# Patient Record
Sex: MALE | Race: WHITE | NOT HISPANIC OR LATINO | URBAN - METROPOLITAN AREA
[De-identification: names, ages, dates, MRNs, and addresses within clinical notes are randomized per-mention and may not be internally consistent; named-entity substitution may affect disease eponyms.]

---

## 2017-05-17 ENCOUNTER — COMMUNICATION - HEALTHEAST (OUTPATIENT)
Dept: SCHEDULING | Facility: CLINIC | Age: 32
End: 2017-05-17

## 2017-05-17 ENCOUNTER — RECORDS - HEALTHEAST (OUTPATIENT)
Dept: ADMINISTRATIVE | Facility: OTHER | Age: 32
End: 2017-05-17

## 2017-07-10 ENCOUNTER — OFFICE VISIT - HEALTHEAST (OUTPATIENT)
Dept: FAMILY MEDICINE | Facility: CLINIC | Age: 32
End: 2017-07-10

## 2017-07-10 DIAGNOSIS — K20.0 EOSINOPHILIC ESOPHAGITIS: ICD-10-CM

## 2017-07-10 DIAGNOSIS — K21.9 GASTROESOPHAGEAL REFLUX DISEASE WITHOUT ESOPHAGITIS: ICD-10-CM

## 2017-07-10 DIAGNOSIS — Z30.09 ENCOUNTER FOR VASECTOMY COUNSELING: ICD-10-CM

## 2017-07-10 DIAGNOSIS — G43.009 MIGRAINE WITHOUT AURA: ICD-10-CM

## 2017-07-10 DIAGNOSIS — K21.9 ESOPHAGEAL REFLUX: ICD-10-CM

## 2017-07-10 ASSESSMENT — MIFFLIN-ST. JEOR: SCORE: 2106.06

## 2017-08-17 ENCOUNTER — RECORDS - HEALTHEAST (OUTPATIENT)
Dept: ADMINISTRATIVE | Facility: OTHER | Age: 32
End: 2017-08-17

## 2018-05-22 ENCOUNTER — OFFICE VISIT - HEALTHEAST (OUTPATIENT)
Dept: FAMILY MEDICINE | Facility: CLINIC | Age: 33
End: 2018-05-22

## 2018-05-22 DIAGNOSIS — K20.0 EOSINOPHILIC ESOPHAGITIS: ICD-10-CM

## 2018-07-15 ENCOUNTER — COMMUNICATION - HEALTHEAST (OUTPATIENT)
Dept: FAMILY MEDICINE | Facility: CLINIC | Age: 33
End: 2018-07-15

## 2018-07-15 DIAGNOSIS — G43.009 MIGRAINE WITHOUT AURA: ICD-10-CM

## 2018-09-20 ENCOUNTER — OFFICE VISIT - HEALTHEAST (OUTPATIENT)
Dept: FAMILY MEDICINE | Facility: CLINIC | Age: 33
End: 2018-09-20

## 2018-09-20 DIAGNOSIS — K20.0 EOSINOPHILIC ESOPHAGITIS: ICD-10-CM

## 2018-09-20 DIAGNOSIS — Z13.220 LIPID SCREENING: ICD-10-CM

## 2018-09-20 DIAGNOSIS — G43.009 MIGRAINE WITHOUT AURA: ICD-10-CM

## 2018-09-20 DIAGNOSIS — K21.9 ESOPHAGEAL REFLUX: ICD-10-CM

## 2018-09-20 LAB
ANION GAP SERPL CALCULATED.3IONS-SCNC: 7 MMOL/L (ref 5–18)
BUN SERPL-MCNC: 8 MG/DL (ref 8–22)
CALCIUM SERPL-MCNC: 9.8 MG/DL (ref 8.5–10.5)
CHLORIDE BLD-SCNC: 105 MMOL/L (ref 98–107)
CHOLEST SERPL-MCNC: 194 MG/DL
CO2 SERPL-SCNC: 28 MMOL/L (ref 22–31)
CREAT SERPL-MCNC: 0.88 MG/DL (ref 0.7–1.3)
FASTING STATUS PATIENT QL REPORTED: YES
GFR SERPL CREATININE-BSD FRML MDRD: >60 ML/MIN/1.73M2
GLUCOSE BLD-MCNC: 92 MG/DL (ref 70–125)
HDLC SERPL-MCNC: 43 MG/DL
HGB BLD-MCNC: 13.7 G/DL (ref 14–18)
LDLC SERPL CALC-MCNC: 120 MG/DL
POTASSIUM BLD-SCNC: 4.4 MMOL/L (ref 3.5–5)
SODIUM SERPL-SCNC: 140 MMOL/L (ref 136–145)
TRIGL SERPL-MCNC: 154 MG/DL

## 2019-01-07 ENCOUNTER — COMMUNICATION - HEALTHEAST (OUTPATIENT)
Dept: FAMILY MEDICINE | Facility: CLINIC | Age: 34
End: 2019-01-07

## 2019-10-07 ENCOUNTER — OFFICE VISIT - HEALTHEAST (OUTPATIENT)
Dept: FAMILY MEDICINE | Facility: CLINIC | Age: 34
End: 2019-10-07

## 2019-10-07 DIAGNOSIS — I88.9 AXILLARY ADENITIS: ICD-10-CM

## 2019-10-07 DIAGNOSIS — K21.9 GASTROESOPHAGEAL REFLUX DISEASE WITHOUT ESOPHAGITIS: ICD-10-CM

## 2019-10-07 DIAGNOSIS — K20.0 EOSINOPHILIC ESOPHAGITIS: ICD-10-CM

## 2019-10-07 DIAGNOSIS — M25.562 LEFT KNEE PAIN, UNSPECIFIED CHRONICITY: ICD-10-CM

## 2019-10-07 DIAGNOSIS — G43.009 MIGRAINE WITHOUT AURA: ICD-10-CM

## 2019-10-07 DIAGNOSIS — G43.009 MIGRAINE WITHOUT AURA AND WITHOUT STATUS MIGRAINOSUS, NOT INTRACTABLE: ICD-10-CM

## 2019-10-07 LAB
ANION GAP SERPL CALCULATED.3IONS-SCNC: 7 MMOL/L (ref 5–18)
BUN SERPL-MCNC: 9 MG/DL (ref 8–22)
CALCIUM SERPL-MCNC: 9.8 MG/DL (ref 8.5–10.5)
CHLORIDE BLD-SCNC: 106 MMOL/L (ref 98–107)
CO2 SERPL-SCNC: 28 MMOL/L (ref 22–31)
CREAT SERPL-MCNC: 1.04 MG/DL (ref 0.7–1.3)
GFR SERPL CREATININE-BSD FRML MDRD: >60 ML/MIN/1.73M2
GLUCOSE BLD-MCNC: 79 MG/DL (ref 70–125)
HGB BLD-MCNC: 13.3 G/DL (ref 14–18)
POTASSIUM BLD-SCNC: 4.3 MMOL/L (ref 3.5–5)
SODIUM SERPL-SCNC: 141 MMOL/L (ref 136–145)

## 2019-10-07 ASSESSMENT — MIFFLIN-ST. JEOR: SCORE: 2092.45

## 2020-07-13 ENCOUNTER — COMMUNICATION - HEALTHEAST (OUTPATIENT)
Dept: SCHEDULING | Facility: CLINIC | Age: 35
End: 2020-07-13

## 2020-08-20 ENCOUNTER — OFFICE VISIT - HEALTHEAST (OUTPATIENT)
Dept: FAMILY MEDICINE | Facility: CLINIC | Age: 35
End: 2020-08-20

## 2020-08-20 DIAGNOSIS — G47.10 HYPERSOMNIA: ICD-10-CM

## 2020-08-20 DIAGNOSIS — K21.9 GASTROESOPHAGEAL REFLUX DISEASE WITHOUT ESOPHAGITIS: ICD-10-CM

## 2020-08-20 DIAGNOSIS — R06.83 SNORING: ICD-10-CM

## 2020-08-20 DIAGNOSIS — Z13.220 LIPID SCREENING: ICD-10-CM

## 2020-08-20 DIAGNOSIS — K20.0 EOSINOPHILIC ESOPHAGITIS: ICD-10-CM

## 2020-08-20 DIAGNOSIS — Z86.2 HISTORY OF ANEMIA: ICD-10-CM

## 2020-08-20 ASSESSMENT — MIFFLIN-ST. JEOR: SCORE: 2115.13

## 2020-08-21 LAB
CHOLEST SERPL-MCNC: 237 MG/DL
ERYTHROCYTE [DISTWIDTH] IN BLOOD BY AUTOMATED COUNT: 12.6 % (ref 11–14.5)
FASTING STATUS PATIENT QL REPORTED: NO
HCT VFR BLD AUTO: 39.5 % (ref 40–54)
HDLC SERPL-MCNC: 45 MG/DL
HGB BLD-MCNC: 14.1 G/DL (ref 14–18)
LDLC SERPL CALC-MCNC: 149 MG/DL
MCH RBC QN AUTO: 32 PG (ref 27–34)
MCHC RBC AUTO-ENTMCNC: 35.7 G/DL (ref 32–36)
MCV RBC AUTO: 90 FL (ref 80–100)
PLATELET # BLD AUTO: 204 THOU/UL (ref 140–440)
PMV BLD AUTO: 9.6 FL (ref 8.5–12.5)
RBC # BLD AUTO: 4.41 MILL/UL (ref 4.4–6.2)
TRIGL SERPL-MCNC: 214 MG/DL
WBC: 6.8 THOU/UL (ref 4–11)

## 2020-08-24 ENCOUNTER — COMMUNICATION - HEALTHEAST (OUTPATIENT)
Dept: FAMILY MEDICINE | Facility: CLINIC | Age: 35
End: 2020-08-24

## 2020-10-02 ENCOUNTER — COMMUNICATION - HEALTHEAST (OUTPATIENT)
Dept: FAMILY MEDICINE | Facility: CLINIC | Age: 35
End: 2020-10-02

## 2020-10-02 DIAGNOSIS — K20.0 EOSINOPHILIC ESOPHAGITIS: ICD-10-CM

## 2020-10-02 DIAGNOSIS — K21.9 GASTROESOPHAGEAL REFLUX DISEASE WITHOUT ESOPHAGITIS: ICD-10-CM

## 2020-10-02 DIAGNOSIS — G43.009 MIGRAINE WITHOUT AURA: ICD-10-CM

## 2021-01-12 VITALS — WEIGHT: 235 LBS | BODY MASS INDEX: 27.75 KG/M2 | HEIGHT: 77 IN

## 2021-01-12 RX ORDER — PROPRANOLOL HYDROCHLORIDE 60 MG/1
60 TABLET ORAL DAILY
COMMUNITY

## 2021-01-12 RX ORDER — PANTOPRAZOLE SODIUM 40 MG/1
40 TABLET, DELAYED RELEASE ORAL DAILY
COMMUNITY

## 2021-01-12 ASSESSMENT — MIFFLIN-ST. JEOR: SCORE: 2108.33

## 2021-01-12 NOTE — PROGRESS NOTES
Ray is a 35 year old who is being evaluated via a billable phone visit.      Patient gave consent for phone visit.    Time on phone callt: 16 minutes      Sleep Consultation:    Date on this visit: 1/13/2021    Ray Sharp is sent by No ref. provider found for a sleep consultation regarding possible sleep apnea.    Primary Physician: Pablo Aponte     Ray Sharp reports nightly snoring and poor quality of sleep for last 5 years.     Medical history is significant for GERD & migraine.     Ray does snore every night. Patient does have a regular bed partner. There is report of snoring and poor quality of sleep.  He does not have witnessed apneas. They never sleep separately.  Patient sleeps on his back and side. He has occasional morning headaches, denies no restless legs. Ray has occasional sleep walking and denies any bruxism, sleep talking, dream enactment, sleep paralysis, cataplexy and hypnogogic/hypnopompic hallucinations. There are episodes when he gets out of bed thinking there are bugs on the bed.     Ray goes to sleep at 10:30 PM during the week. He wakes up at 5:30 AM without an alarm. He falls asleep in 15 minutes.  Ray denies difficulty falling asleep.  He wakes up 0-1 times a night for 5 minutes before falling back to sleep.  Ray wakes up to go to the bathroom.  On weekends, Ray goes to sleep at 11:00 PM.  He wakes up at 7:00 AM without an alarm. He falls asleep in 15 minutes.  Patient gets an average of 7 hours of sleep per night.     He confirms sleep walking as a child.    Patient's [place] Sleepiness score 3/24 consistent with no daytime sleepiness.      Ray naps 0 times per week . He takes no inadvertant naps.  He denies closing eyes, dozing and falling asleep while driving.  Patient was counseled on the importance of driving while alert, to pull over if drowsy, or nap before getting into the vehicle if sleepy.      He uses no caffeine.    Allergies:     Allergies   Allergen Reactions     Codeine Anaphylaxis       Medications:    Current Outpatient Medications   Medication Sig Dispense Refill     pantoprazole (PROTONIX) 40 MG EC tablet Take 40 mg by mouth daily       propranolol (INDERAL) 60 MG tablet Take 60 mg by mouth daily         Problem List:  There are no active problems to display for this patient.       Past Medical/Surgical History:  Past Medical History:   Diagnosis Date     GERD (gastroesophageal reflux disease)      Migraine      No past surgical history on file.    Social History:  Social History     Socioeconomic History     Marital status:      Spouse name: Not on file     Number of children: Not on file     Years of education: Not on file     Highest education level: Not on file   Occupational History     Not on file   Social Needs     Financial resource strain: Not on file     Food insecurity     Worry: Not on file     Inability: Not on file     Transportation needs     Medical: Not on file     Non-medical: Not on file   Tobacco Use     Smoking status: Never Smoker     Smokeless tobacco: Never Used   Substance and Sexual Activity     Alcohol use: Not on file     Drug use: Not on file     Sexual activity: Not on file   Lifestyle     Physical activity     Days per week: Not on file     Minutes per session: Not on file     Stress: Not on file   Relationships     Social connections     Talks on phone: Not on file     Gets together: Not on file     Attends [employer]odoxy service: Not on file     Active member of club or organization: Not on file     Attends meetings of clubs or organizations: Not on file     Relationship status: Not on file     Intimate partner violence     Fear of current or ex partner: Not on file     Emotionally abused: Not on file     Physically abused: Not on file     Forced sexual activity: Not on file   Other Topics Concern     Parent/sibling w/ CABG, MI or angioplasty before 65F 55M? Not Asked   Social History Narrative      "Not on file       Family History:  Family History   Problem Relation Age of Onset     Sleep Apnea Father        Review of Systems:  A complete review of systems reviewed by me is negative with the exeption of what has been mentioned in the history of present illness.  CONSTITUTIONAL: NEGATIVE for weight gain/loss, fever, chills, sweats or night sweats, drug allergies.  EYES: NEGATIVE for changes in vision, blind spots, double vision.  ENT: NEGATIVE for ear pain, sore throat, sinus pain, post-nasal drip, runny nose, bloody nose  CARDIAC: NEGATIVE for fast heartbeats or fluttering in chest, chest pain or pressure, breathlessness when lying flat, swollen legs or swollen feet.  NEUROLOGIC: NEGATIVE headaches, weakness or numbness in the arms or legs.  DERMATOLOGIC: NEGATIVE for rashes, new moles or change in mole(s)  PULMONARY: NEGATIVE SOB at rest, SOB with activity, dry cough, productive cough, coughing up blood, wheezing or whistling when breathing.    GASTROINTESTINAL: NEGATIVE for nausea or vomitting, loose or watery stools, fat or grease in stools, constipation, abdominal pain, bowel movements black in color or blood noted.  GENITOURINARY: NEGATIVE for pain during urination, blood in urine, urinating more frequently than usual, irregular menstrual periods.  MUSCULOSKELETAL: NEGATIVE for muscle pain, bone or joint pain, swollen joints.  ENDOCRINE: NEGATIVE for increased thirst or urination, diabetes.  LYMPHATIC: NEGATIVE for swollen lymph nodes, lumps or bumps in the breasts or nipple discharge.    Physical Examination:  Vitals: Ht 1.956 m (6' 5\")   Wt 106.6 kg (235 lb)   BMI 27.87 kg/m    BMI= Body mass index is 27.87 kg/m .         [place] Total Score 1/12/2021   Total score - [place] 3       RAJ Total Score: 10 (01/12/21 1500)    Impression/Plan:    1. Probable Obstructive sleep apnea     Patient is a 35 years old male, BMI 27, who presents with history of loud snoring, non restorative sleep and daytime " fatigue. There is an intermediate risk for sleep apnea and an overnight sleep study is recommended for assessment.     Plan:     1. Home sleep apnea testing     Literature provided regarding sleep apnea.      He will follow up with me in approximately two weeks after his sleep study has been competed to review the results and discuss plan of care.       Polysomnography & HST reviewed.  Limitations of HST reviewed.   Obstructive sleep apnea reviewed.  Complications of untreated sleep apnea were reviewed.    I spent a total of 30 minutes for this appointment today which include time spent before, during and after the visit for patient care, counseling and coordination of care.    Dr. Leighton Barton     CC: No ref. provider found

## 2021-01-12 NOTE — PATIENT INSTRUCTIONS
"MY TREATMENT INFORMATION FOR SLEEP APNEA-  [facility]    DOCTOR : Leighton Barton MD, MD    Am I having a sleep study at a sleep center?  --->Due to insurance clearance delays, you will be contacted within 2-4 weeks to schedule    Am I having a home sleep study?  --->Watch the video for the device you are using:    /drop off device-   https://www.[employer].com/watch?v=yGGFBdELGhk    Disposable device sent out require phone/computer application-   https://www.[employer].com/watch?v=BCce_vbiwxE      Frequently asked questions:  1. What is Obstructive Sleep Apnea (YOGI)? YOGI is the most common type of sleep apnea. Apnea means, \"without breath.\"  Apnea is most often caused by narrowing or collapse of the upper airway as muscles relax during sleep.   Almost everyone has occasional apneas. Most people with sleep apnea have had brief interruptions at night frequently for many years.  The severity of sleep apnea is related to how frequent and severe the events are.   2. What are the consequences of YOGI? Symptoms include: feeling sleepy during the day, snoring loudly, gasping or stopping of breathing, trouble sleeping, and occasionally morning headaches or heartburn at night.  Sleepiness can be serious and even increase the risk of falling asleep while driving. Other health consequences may include development of high blood pressure and other cardiovascular disease in persons who are susceptible. Untreated YOGI  can contribute to heart disease, stroke and diabetes.   3. What are the treatment options? In most situations, sleep apnea is a lifelong disease that must be managed with daily therapy. Medications are not effective for sleep apnea and surgery is generally not considered until other therapies have been tried. Your treatment is your choice . Continuous Positive Airway (CPAP) works right away and is the therapy that is effective in nearly everyone. An oral device to hold your jaw forward is usually the next most " reliable option. Other options include postioning devices (to keep you off your back), weight loss, and surgery including a tongue pacing device. There is more detail about some of these options below.  4. Are my sleep studies covered by insurance? Although we will request verification of coverage, we advise you also check in advance of the study to ensure there is coverage.    Important tips for those choosing CPAP and similar devices   Know your equipment:  CPAP is continuous positive airway pressure that prevents obstructive sleep apnea by keeping the throat from collapsing while you are sleeping. In most cases, the device is  smart  and can slowly self-adjusts if your throat collapses and keeps a record every day of how well you are treated-this information is available to you and your care team.  BPAP is bilevel positive airway pressure that keeps your throat open and also assists each breath with a pressure boost to maintain adequate breathing.  Special kinds of BPAP are used in patients who have inadequate breathing from lung or heart disease. In most cases, the device is  smart  and can slowly self-adjusts to assist breathing. Like CPAP, the device keeps a record of how well you are treated.  Your mask is your connection to the device. You get to choose what feels most comfortable and the staff will help to make sure if fits. Here: are some examples of the different masks that are available:       Key points to remember on your journey with sleep apnea:  1. Sleep study.  PAP devices often need to be adjusted during a sleep study to show that they are effective and adjusted right.  2. Good tips to remember: Try wearing just the mask during a quiet time during the day so your body adapts to wearing it. A humidifier is recommended for comfort in most cases to prevent drying of your nose and throat. Allergy medication from your provider may help you if you are having nasal congestion.  3. Getting settled-in. It  takes more than one night for most of us to get used to wearing a mask. Try wearing just the mask during a quiet time during the day so your body adapts to wearing it. A humidifier is recommended for comfort in most cases. Our team will work with you carefully on the first day and will be in contact within 4 days and again at 2 and 4 weeks for advice and remote device adjustments. Your therapy is evaluated by the device each day.   4. Use it every night. The more you are able to sleep naturally for 7-8 hours, the more likely you will have good sleep and to prevent health risks or symptoms from sleep apnea. Even if you use it 4 hours it helps. Occasionally all of us are unable to use a medical therapy, in sleep apnea, it is not dangerous to miss one night.   5. Communicate. Call our skilled team on the number provided on the first day if your visit for problems that make it difficult to wear the device. Over 2 out of 3 patients can learn to wear the device long-term with help from our team. Remember to call our team or your sleep providers if you are unable to wear the device as we may have other solutions for those who cannot adapt to mask CPAP therapy. It is recommended that you sleep your sleep provider within the first 3 months and yearly after that if you are not having problems.   6. Use it for your health. We encourage use of CPAP masks during daytime quiet periods to allow your face and brain to adapt to the sensation of CPAP so that it will be a more natural sensation to awaken to at night or during naps. This can be very useful during the first few weeks or months of adapting to CPAP though it does not help medically to wear CPAP during wakefulness and  should not be used as a strategy just to meet guidelines.  7. Take care of your equipment. Make sure you clean your mask and tubing using directions every day and that your filter and mask are replaced as recommended or if they are not working.     BESIDES  CPAP, WHAT OTHER THERAPIES ARE THERE?    Positioning Device  Positioning devices are generally used when sleep apnea is mild and only occurs on your back.This example shows a pillow that straps around the waist. It may be appropriate for those whose sleep study shows milder sleep apnea that occurs primarily when lying flat on one's back. Preliminary studies have shown benefit but effectiveness at home may need to be verified by a home sleep test. These devices are generally not covered by medical insurance.  Examples of devices that maintain sleeping on the back to prevent snoring and mild sleep apnea.    Belt type body positioner  http://[email or web address]/    Electronic reminder  http://nightshifttherapy.com/  http://[email or web address]/      Oral Appliance  What is oral appliance therapy?  An oral appliance device fits on your teeth at night like a retainer used after having braces. The device is made by a specialized dentist and requires several visits over 1-2 months before a manufactured device is made to fit your teeth and is adjusted to prevent your sleep apnea. Once an oral device is working properly, snoring should be improved. A home sleep test may be recommended at that time if to determine whether the sleep apnea is adequately treated.       Some things to remember:  -Oral devices are often, but not always, covered by your medical insurance. Be sure to check with your insurance provider.   -If you are referred for oral therapy, you will be given a list of specialized dentists to consider or you may choose to visit the Web site of the American Academy of Dental Sleep Medicine  -Oral devices are less likely to work if you have severe sleep apnea or are extremely overweight.     More detailed information  An oral appliance is a small acrylic device that fits over the upper and lower teeth  (similar to a retainer or a mouth guard). This device slightly moves jaw forward, which moves the base of the tongue forward,  opens the airway, improves breathing for effective treat snoring and obstructive sleep apnea in perhaps 7 out of 10 people .  The best working devices are custom-made by a dental device  after a mold is made of the teeth 1, 2, 3.  When is an oral appliance indicated?  Oral appliance therapy is recommended as a first-line treatment for patients with primary snoring, mild sleep apnea, and for patients with moderate sleep apnea who prefer appliance therapy to use of CPAP4, 5. Severity of sleep apnea is determined by sleep testing and is based on the number of respiratory events per hour of sleep.   How successful is oral appliance therapy?  The success rate of oral appliance therapy in patients with mild sleep apnea is 75-80% while in patients with moderate sleep apnea it is 50-70%. The chance of success in patients with severe sleep apnea is 40-50%. The research also shows that oral appliances have a beneficial effect on the cardiovascular health of YOGI patients at the same magnitude as CPAP therapy7.  Oral appliances should be a second-line treatment in cases of severe sleep apnea, but if not completely successful then a combination therapy utilizing CPAP plus oral appliance therapy may be effective. Oral appliances tend to be effective in a broad range of patients although studies show that the patients who have the highest success are females, younger patients, those with milder disease, and less severe obesity. 3, 6.   Finding a dentist that practices dental sleep medicine  Specific training is available through the American Academy of Dental Sleep Medicine for dentists interested in working in the field of sleep. To find a dentist who is educated in the field of sleep and the use of oral appliances, near you, visit the Web site of the American Academy of Dental Sleep Medicine.    References  1. Irineo et al. Objectively measured vs self-reported compliance during oral appliance therapy for  sleep-disordered breathing. Chest 2013; 144(5): 8553-0371.  2. Dioni, et al. Objective measurement of compliance during oral appliance therapy for sleep-disordered breathing. Thorax 2013; 68(1): 91-96.  3. Regulo et al. Mandibular advancement devices in 620 men and women with YOGI and snoring: tolerability and predictors of treatment success. Chest 2004; 125: 4085-8933.  4. Almaz et al. Oral appliances for snoring and YOGI: a review. Sleep 2006; 29: 244-262.  5. Caitlin et al. Oral appliance treatment for YOGI: an update. J Clin Sleep Med 2014; 10(2): 215-227.  6. Bhakti et al. Predictors of OSAH treatment outcome. J Dent Res 2007; 86: 1617-0764.      Weight Loss:    Weight loss is a long-term strategy that may improve sleep apnea in some patients.    Weight management is a personal decision and the decision should be based on your interest and the potential benefits.  If you are interested in exploring weight loss strategies, the following discussion covers the impact on weight loss on sleep apnea and the approaches that may be successful.    Being overweight does not necessarily mean you will have health consequences.  Those who have BMI over 35 or over 27 with existing medical conditions carries greater risk.   Weight loss decreases severity of sleep apnea in most people with obesity. For those with mild obesity who have developed snoring with weight gain, even 15-30 pound weight loss can improve and occasionally eliminate sleep apnea.  Structured and life-long dietary and health habits are necessary to lose weight and keep healthier weight levels.     Though there may be significant health benefits from weight loss, long-term weight loss is very difficult to achieve- studies show success with dietary management in less than 10% of people. In addition, substantial weight loss may require years of dietary control and may be difficult if patients have severe obesity. In these cases, surgical  management may be considered.  Finally, older individuals who have tolerated obesity without health complications may be less likely to benefit from weight loss strategies.        Your BMI is Body mass index is 27.87 kg/m .  Weight management is a personal decision.  If you are interested in exploring weight loss strategies, the following discussion covers the approaches that may be successful. Body mass index (BMI) is one way to tell whether you are at a healthy weight, overweight, or obese. It measures your weight in relation to your height.  A BMI of 18.5 to 24.9 is in the healthy range. A person with a BMI of 25 to 29.9 is considered overweight, and someone with a BMI of 30 or greater is considered obese. More than two-thirds of American adults are considered overweight or obese.  Being overweight or obese increases the risk for further weight gain. Excess weight may lead to heart disease and diabetes.  Creating and following plans for healthy eating and physical activity may help you improve your health.  Weight control is part of healthy lifestyle and includes exercise, emotional health, and healthy eating habits. Careful eating habits lifelong are the mainstay of weight control. Though there are significant health benefits from weight loss, long-term weight loss with diet alone may be very difficult to achieve- studies show long-term success with dietary management in less than 10% of people. Attaining a healthy weight may be especially difficult to achieve in those with severe obesity. In some cases, medications, devices and surgical management might be considered.  What can you do?  If you are overweight or obese and are interested in methods for weight loss, you should discuss this with your provider.     Consider reducing daily calorie intake by 500 calories.     Keep a food journal.     Avoiding skipping meals, consider cutting portions instead.    Diet combined with exercise helps maintain muscle while  optimizing fat loss. Strength training is particularly important for building and maintaining muscle mass. Exercise helps reduce stress, increase energy, and improves fitness. Increasing exercise without diet control, however, may not burn enough calories to loose weight.       Start walking three days a week 10-20 minutes at a time    Work towards walking thirty minutes five days a week     Eventually, increase the speed of your walking for 1-2 minutes at time    In addition, we recommend that you review healthy lifestyles and methods for weight loss available through the National Institutes of Health patient information sites:  http://win.niddk.nih.gov/publications/index.htm    And look into health and wellness programs that may be available through your health insurance provider, employer, local community center, or frank club.    Weight management plan: Patient was referred to their PCP to discuss a diet and exercise plan.      Surgery:    Surgery for obstructive sleep apnea is considered generally only when other therapies fail to work. Surgery may be discussed with you if you are having a difficult time tolerating CPAP and or when there is an abnormal structure that requires surgical correction.  Nose and throat surgeries often enlarge the airway to prevent collapse.  Most of these surgeries create pain for 1-2 weeks and up to half of the most common surgeries are not effective throughout life.  You should carefully discuss the benefits and drawbacks to surgery with your sleep provider and surgeon to determine if it is the best solution for you.   More information  Surgery for YOGI is directed at areas that are responsible for narrowing or complete obstruction of the airway during sleep.  There are a wide range of procedures available to enlarge and/or stabilize the airway to prevent blockage of breathing in the three major areas where it can occur: the palate, tongue, and nasal regions.  Successful surgical  treatment depends on the accurate identification of the factors responsible for obstructive sleep apnea in each person.  A personalized approach is required because there is no single treatment that works well for everyone.  Because of anatomic variation, consultation with an examination by a sleep surgeon is a critical first step in determining what surgical options are best for each patient.  In some cases, examination during sedation may be recommended in order to guide the selection of procedures.  Patients will be counseled about risks and benefits as well as the typical recovery course after surgery. Surgery is typically not a cure for a person s YOGI.  However, surgery will often significantly improve one s YOGI severity (termed  success rate ).  Even in the absence of a cure, surgery will decrease the cardiovascular risk associated with OSA7; improve overall quality of life8 (sleepiness, functionality, sleep quality, etc).      Palate Procedures:  Patients with YOGI often have narrowing of their airway in the region of their tonsils and uvula.  The goals of palate procedures are to widen the airway in this region as well as to help the tissues resist collapse.  Modern palate procedure techniques focus on tissue conservation and soft tissue rearrangement, rather than tissue removal.  Often the uvula is preserved in this procedure. Residual sleep apnea is common in patient after pharyngoplasty with an average reduction in sleep apnea events of 33%2.      Tongue Procedures:  ExamWhile patients are awake, the muscles that surround the throat are active and keep this region open for breathing. These muscles relax during sleep, allowing the tongue and other structures to collapse and block breathing.  There are several different tongue procedures available.  Selection of a tongue base procedure depends on characteristics seen on physical exam.  Generally, procedures are aimed at removing bulky tissues in this area or  preventing the back of the tongue from falling back during sleep.  Success rates for tongue surgery range from 50-62%3.    Hypoglossal Nerve Stimulation:  Hypoglossal nerve stimulation has recently received approval from the [place] Food and Drug Administration for the treatment of obstructive sleep apnea.  This is based on research showing that the system was safe and effective in treating sleep apnea6.  Results showed that the median AHI score decreased 68%, from 29.3 to 9.0. This therapy uses an implant system that senses breathing patterns and delivers mild stimulation to airway muscles, which keeps the airway open during sleep.  The system consists of three fully implanted components: a small generator (similar in size to a pacemaker), a breathing sensor, and a stimulation lead.  Using a small handheld remote, a patient turns the therapy on before bed and off upon awakening.    Candidates for this device must be greater than 22 years of age, have moderate to severe YOGI (AHI between 20-65), BMI less than 32, have tried CPAP/oral appliance without success, and have appropriate upper airway anatomy (determined by a sleep endoscopy performed by Dr. Hurt).    Hypoglossal Nerve Stimulation Pathway:    The sleep surgeon s office will work with the patient through the insurance prior-authorization process (including communications and appeals).    Nasal Procedures:  Nasal obstruction can interfere with nasal breathing during the day and night.  Studies have shown that relief of nasal obstruction can improve the ability of some patients to tolerate positive airway pressure therapy for obstructive sleep apnea1.  Treatment options include medications such as nasal saline, topical corticosteroid and antihistamine sprays, and oral medications such as antihistamines or decongestants. Non-surgical treatments can include external nasal dilators for selected patients. If these are not successful by themselves, surgery can  improve the nasal airway either alone or in combination with these other options.      Combination Procedures:  Combination of surgical procedures and other treatments may be recommended, particularly if patients have more than one area of narrowing or persistent positional disease.  The success rate of combination surgery ranges from 66-80%2,3.    References  1. Tayo CONNORS. The Role of the Nose in Snoring and Obstructive Sleep Apnoea: An Update.  Eur Arch Otorhinolaryngol. 2011; 268: 1365-73.  2.  Rakel SM; Gui JA; Rima JR; Pallanch JF; Chuckie MB; Amanda SG; Sarah AMBROSIO. Surgical modifications of the upper airway for obstructive sleep apnea in adults: a systematic review and meta-analysis. SLEEP 2010;33(10):2698-3891. Kem JUNE. Hypopharyngeal surgery in obstructive sleep apnea: an evidence-based medicine review.  Arch Otolaryngol Head Neck Surg. 2006 Feb;132(2):206-13.  3. Bharat YH1, Harvey Y, Markie JOSE. The efficacy of anatomically based multilevel surgery for obstructive sleep apnea. Otolaryngol Head Neck Surg. 2003 Oct;129(4):327-35.  4. Kem JUNE, Goldberg A. Hypopharyngeal Surgery in Obstructive Sleep Apnea: An Evidence-Based Medicine Review. Arch Otolaryngol Head Neck Surg. 2006 Feb;132(2):206-13.  5. Tom GHOSH et al. Upper-Airway Stimulation for Obstructive Sleep Apnea.  N Engl J Med. 2014 Jan 9;370(2):139-49.  6. Siomara Y et al. Increased Incidence of Cardiovascular Disease in Middle-aged Men with Obstructive Sleep Apnea. Am J Respir Crit Care Med; 2002 166: 159-165  7. Rangel EM et al. Studying Life Effects and Effectiveness of Palatopharyngoplasty (SLEEP) study: Subjective Outcomes of Isolated Uvulopalatopharyngoplasty. Otolaryngol Head Neck Surg. 2011; 144: 623-631.

## 2021-01-13 ENCOUNTER — VIRTUAL VISIT (OUTPATIENT)
Dept: SLEEP MEDICINE | Facility: CLINIC | Age: 36
End: 2021-01-13
Payer: COMMERCIAL

## 2021-01-13 DIAGNOSIS — R06.83 SNORING: ICD-10-CM

## 2021-01-13 DIAGNOSIS — R53.82 CHRONIC FATIGUE, UNSPECIFIED: ICD-10-CM

## 2021-01-13 DIAGNOSIS — R29.818 SUSPECTED SLEEP APNEA: Primary | ICD-10-CM

## 2021-01-13 PROCEDURE — 99203 OFFICE O/P NEW LOW 30 MIN: CPT | Mod: TEL | Performed by: INTERNAL MEDICINE

## 2021-01-15 ENCOUNTER — HEALTH MAINTENANCE LETTER (OUTPATIENT)
Age: 36
End: 2021-01-15

## 2021-05-29 ENCOUNTER — RECORDS - HEALTHEAST (OUTPATIENT)
Dept: ADMINISTRATIVE | Facility: CLINIC | Age: 36
End: 2021-05-29

## 2021-05-31 VITALS — BODY MASS INDEX: 28.98 KG/M2 | HEIGHT: 76 IN | WEIGHT: 238 LBS

## 2021-06-01 VITALS — BODY MASS INDEX: 28.95 KG/M2 | WEIGHT: 237.8 LBS

## 2021-06-02 VITALS — WEIGHT: 236 LBS | BODY MASS INDEX: 28.73 KG/M2

## 2021-06-02 NOTE — PROGRESS NOTES
Assessment:  1..  Eosinophilic esophagitis, controlled.  2.  Esophageal reflux.  Controlled.  3.  Common migraine headaches recurrent, controlled.  4.  Left knee sprain, healing.  5.  Axillary adenitis.    Plan: Prescribe cephalexin 500 mg-#40-1 p.o. 4 times daily for the axillary adenitis.  Check hemoglobin and basic profile.  Recommend time and very slow gentle stretching for the left knee.  Renewed prescription for propranolol extended release 60 mg-1 p.o. daily #90 refillable x3.  Changed to pantoprazole from 20 mg twice a day to a 40 mg once a day regimen-#90 refillable x3.  Follow-up at minimum in 1 year or earlier as needed.    Subjective: 33-year-old male presenting for follow-up on multiple issues and evaluation of several new issues.  Regarding his stomach he denies any chest pain reflux heartburn indigestion etc. on the current regimen.  He would like to continue her previous recommendations.  He notes his last migraine headache was about 6 months ago and feels the regimen is worked and would like to stay on it.  He notes that about a week ago he had hurt his left knee when he stepped in a hole that he had been trying to fill in and he feels that over the last week the pain is doing better.  He feels it more in the back of the knee now.  He is able to walk.  He also notes that he has had some swelling occurred in the glands in the right armpit that came up in the last week or 2 but so far is not clearing up.  No high fever.  He is otherwise been feeling okay.  Patient Active Problem List   Diagnosis     Eosinophilic Esophagitis     Common Migraine (Without Aura)     Esophageal Reflux     History reviewed. No pertinent past medical history.  Allergies   Allergen Reactions     Codeine      Sertraline Rash     Current Outpatient Medications on File Prior to Visit   Medication Sig Dispense Refill     [DISCONTINUED] pantoprazole (PROTONIX) 20 MG tablet Take 1 tablet (20 mg total) by mouth 2 (two) times a day.  "180 tablet 3     [DISCONTINUED] propranolol (INDERAL LA) 60 mg 24 hr capsule TAKE 1 CAPSULE(60 MG) BY MOUTH DAILY 90 capsule 3     No current facility-administered medications on file prior to visit.      All other review of systems are negative.    Objective:/86   Pulse 68   Ht 6' 4\" (1.93 m)   Wt (!) 235 lb (106.6 kg)   SpO2 96%   BMI 28.61 kg/m    HEENT shows no acute change.  Neck supple without adenopathy or thyromegaly.  Lungs clear.  Heart regular rate and rhythm without murmur.  Abdomen shows no masses tenderness or hepatosplenomegaly.  No pedal edema.  Bilateral axillary exam shows more on the right side there are several subcutaneous nodules that are tender that are slightly less than marble size.  No fluctuance.  "

## 2021-06-03 VITALS
DIASTOLIC BLOOD PRESSURE: 86 MMHG | HEART RATE: 68 BPM | WEIGHT: 235 LBS | OXYGEN SATURATION: 96 % | SYSTOLIC BLOOD PRESSURE: 118 MMHG | HEIGHT: 76 IN | BODY MASS INDEX: 28.62 KG/M2

## 2021-06-04 VITALS
OXYGEN SATURATION: 99 % | SYSTOLIC BLOOD PRESSURE: 126 MMHG | DIASTOLIC BLOOD PRESSURE: 84 MMHG | WEIGHT: 240 LBS | BODY MASS INDEX: 29.22 KG/M2 | HEART RATE: 65 BPM | HEIGHT: 76 IN

## 2021-06-09 NOTE — TELEPHONE ENCOUNTER
Ray's wife Sandra calls in to report that he is having difficulty swallowing and that it seems to be related to esophageal issues. He had this happen in the past and went to ER.  He would prefer not to go but discussed and stressed that when he lays down, how does he do with his breathing and swallowing.  He would prefer urgent care but they are all closed now. Told her that if he is having difficulty they will have to go to ER.  If they can make it overnight they can call and try to see his  Provider tomorrow.  He should be seen sooner than later.  She agrees to these plans.     Reason for Disposition    Preventing pills getting stuck in esophagus, questions about    [1] Swallowing difficulty AND [2] cause unknown (Exception: difficulty swallowing is a chronic symptom)    Additional Information    Negative: [1] Severe difficulty swallowing (e.g., drooling or spitting) AND [2] started suddenly after taking a medicine or allergic food    Negative: Wheezing, stridor, hoarseness, or difficulty breathing    Negative: [1] Swollen tongue AND [2] sudden onset    Negative: Sounds like a life-threatening emergency to the triager    Negative: Weak immune system (e.g., HIV positive, cancer chemo, splenectomy, organ transplant, chronic steroids)    Negative: [1] Symptoms of pill stuck in throat or esophagus (e.g., pain in throat or chest, FB sensation) AND [2] no relief after using CARE ADVICE    Negative: [1] Coughing spells AND [2] occur during eating/feedings or within 2 hours    Negative: Fever > 100.5 F (38.1 C)    Negative: SEVERE symptoms of pill stuck in throat or esophagus (e.g., severe pain, bleeding, or inability to swallow liquids)    Negative: [1] Drinking very little AND [2] dehydration suspected (e.g., no urine > 12 hours, very dry mouth, very lightheaded)    Negative: [1] Refuses to drink anything AND [2] for > 12 hours    Negative: Patient sounds very sick or weak to the triager    Protocols used: SWALLOWING  DIFFICULTY-A-AH

## 2021-06-09 NOTE — TELEPHONE ENCOUNTER
I would agree that the patient needs to be seen and I would be happy to see him by F2F or virtual visit this week or next. If that is not soon enough someone else can see the patient at  or he would need to [facility].    Thanks,  Dakota Armstrong MD

## 2021-06-09 NOTE — TELEPHONE ENCOUNTER
[facility] - please schedule him with Dr. Armstrong, he has a couple of openings tomorrow

## 2021-06-10 NOTE — PROGRESS NOTES
Chief Complaint   Patient presents with     Medication Management     Not sure if he is having a reaction to pantoprazole. States that he had a GI attack a few years ago.        HPI: Patient presents today for a med check.  Continues to take his pantoprazole in the setting of his known eosinophilic esophagitis.  His flareups used to be every 3 years, but now it is happening about once a year.  Unclear triggers.  In the past he would swallow fluticasone, but has not needed that for some time.  The pain always self resolves.    Patient notes that his wife is concerned about his snoring.  This is been an issue for a while.  It seems better when he uses nasal strips.  He does have a history of nasal trauma in the past and wonders if this could be related.  His father has a history of obstructive sleep apnea.  He sometimes wakes up with headaches.  He oftentimes will not wake up feeling refreshed.    Labs show history of mild anemia.  Patient does donate blood periodically.  No melena or hematochezia.  No lightheadedness or dyspnea on exertion.    ROS:Review of Systems - negative except for what's listed in the HPI    SH: The Patient's  reports that he quit smoking about 15 years ago. He has never used smokeless tobacco. He reports current alcohol use. He reports that he does not use drugs.      FH: The Patient's family history includes Heart disease (age of onset: 45) in his paternal grandfather; Hypertension in his father.     Meds:    Current Outpatient Medications on File Prior to Visit   Medication Sig Dispense Refill     cholecalciferol, vitamin D3, (VITAMIN D3) 5,000 unit Tab Take by mouth.       multivitamin with minerals (THERA-M) 9 mg iron-400 mcg Tab tablet Take 1 tablet by mouth daily.       pantoprazole (PROTONIX) 40 MG tablet Take 1 tablet (40 mg total) by mouth daily. 90 tablet 3     propranolol (INDERAL LA) 60 mg 24 hr capsule TAKE 1 CAPSULE(60 MG) BY MOUTH DAILY 90 capsule 3     No current  "facility-administered medications on file prior to visit.        O:  /84   Pulse 65   Ht 6' 4\" (1.93 m)   Wt (!) 240 lb (108.9 kg)   SpO2 99%   BMI 29.21 kg/m      Physical Examination:   General appearance - alert, well appearing, and in no distress  Mental status - alert, oriented to person, place, and time  Mouth - mucous membranes moist, pharynx normal  Neck - no significant adenopathy  Chest - clear to auscultation, no wheezes, rales or rhonchi, symmetric air entry  Heart - normal rate and regular rhythm, S1 and S2 normal, no murmurs noted  Abdomen - soft, nontender, nondistended, no masses or organomegaly  Extremities - peripheral pulses normal, no pedal edema, no cyanosis  Skin - normal coloration and turgor.      A/P:     Problem List Items Addressed This Visit     Eosinophilic Esophagitis    Esophageal Reflux      Other Visit Diagnoses     Snoring    -  Primary    Relevant Orders    Ambulatory referral to Sleep Medicine    Hypersomnia        Relevant Orders    Ambulatory referral to Sleep Medicine    History of anemia        Relevant Orders    HM2(CBC w/o Differential)    Lipid screening        Relevant Orders    Lipid Cascade RANDOM        Screening lipids.  Anemia could be related to blood donations in the past.  Recheck today.  Continue with pantoprazole for GERD.  Potential for YOGI given snoring and hypersomnia.  Referral to sleep medicine placed.  In regards to the eosinophilic esophagitis, we discussed continuing to monitor versus trying to re-treat with either budesonide or fluticasone swallowed.  For now he would like to keep an eye on it.  We can always reconnect with gastroenterology as well.    1. Snoring  - Ambulatory referral to Sleep Medicine    2. Eosinophilic esophagitis    3. Gastroesophageal reflux disease without esophagitis    4. Hypersomnia  - Ambulatory referral to Sleep Medicine    5. History of anemia  - HM2(CBC w/o Differential)    6. Lipid screening  - Lipid Cascade " RANDOM        Pablo Aponte, CNP

## 2021-06-10 NOTE — PATIENT INSTRUCTIONS - HE
If the eosinophilic esophagitis flareup starts to become more frequent or bothersome, let me know and we can either try re-swallowing the steroids or reconnect you with gastroenterology.    Usual blood work today.    I have placed a referral to sleep medicine to assess the snoring and potential sleep apnea.  They should be calling you.  Let me know if you do not hear anything within a couple weeks.

## 2021-06-11 NOTE — PROGRESS NOTES
Assessment:  1.  Migraine headaches, controlled.  2.  Gastroesophageal reflux and history of eosinophilic esophagitis, controlled.  3.  Vasectomy counseling.  4.  Overweight.    Plan: Prescribed propranolol and pantoprazole for the next year.  Because of his lab done at [facility] in May, did not need to do any lab today.  Refer to [facility] for vasectomy.  Discussed diet and exercise approach to weight reduction on top of what he is doing here.  Did explain that following vasectomy he would want to make sure for 5 days that he did no lifting over 10 pounds, no intercourse, no heavy lifting, etc. to to reduce the risk of pain afterwards etc.  Reviewed basics but will have him see urology for consultation.    Subjective: 31-year-old male presenting for follow-up and recommendations regarding above.  A year ago when he had tried going off of propranolol for 1 week he did have flareup of migraine headaches.  But since he has been on it he has done very well over the last year.  He has been on the pantoprazole for a number of years.  He is not having any current reflux problems but he did have a ER visit in May to [facility] because of a pain that was felt to be a GI pain.  He had heart attack ruled out and see that report from May 17.  He notes that he and his wife have 1 child and they are fairly certainly do not want to have any further children and he wonders about getting referred for vasectomy.  He notes that he has been trying to lose weight but has been about the same weight over the last year despite being very careful with his diet in the last 8-10 months and thinks he is eating about 1500 celine per day and working out on a regular basis.  History reviewed. No pertinent past medical history.  Current Outpatient Prescriptions   Medication Sig Dispense Refill     pantoprazole (PROTONIX) 40 MG tablet TAKE ONE TABLET BY MOUTH EVERY DAY 90 tablet 3     propranolol (INDERAL LA) 60 mg 24 hr  "capsule Take 1 capsule (60 mg total) by mouth daily. 90 capsule 3     No current facility-administered medications for this visit.      Allergies   Allergen Reactions     Codeine      Sertraline Rash     All other review of systems negative.    Objective:/78  Pulse 78  Ht 6' 4\" (1.93 m)  Wt (!) 238 lb (108 kg)  BMI 28.97 kg/m2  HEENT exam negative neck supple.  Lungs clear.  Heart regular rate and rhythm without murmur.  Abdomen shows no masses tenderness or hepatosplenomegaly.  No pedal edema.  "

## 2021-06-12 NOTE — TELEPHONE ENCOUNTER
Refill Approved    Rx renewed per Medication Renewal Policy. Medication was last renewed on 10/7/19, last OV 8/20/20.    Ashley Soriano, [facility] Triage/Med Refill 10/3/2020     Requested Prescriptions   Pending Prescriptions Disp Refills     pantoprazole (PROTONIX) 40 MG tablet [Pharmacy Med Name: PANTOPRAZOLE 40MG TABLETS] 90 tablet 3     Sig: TAKE 1 TABLET(40 MG) BY MOUTH DAILY       GI Medications Refill Protocol Passed - 10/2/2020  7:17 AM        Passed - PCP or prescribing provider visit in last 12 or next 3 months.     Last office visit with prescriber/PCP: 10/7/2019 Miles Martinez MD OR same dept: 8/20/2020 Pablo Aponte CNP OR same specialty: 8/20/2020 Pablo Aponte CNP  Last physical: Visit date not found Last MTM visit: Visit date not found   Next visit within 3 mo: Visit date not found  Next physical within 3 mo: Visit date not found  Prescriber OR PCP: Miles Martinez MD  Last diagnosis associated with med order: 1. Gastroesophageal reflux disease without esophagitis  - pantoprazole (PROTONIX) 40 MG tablet [Pharmacy Med Name: PANTOPRAZOLE 40MG TABLETS]; TAKE 1 TABLET(40 MG) BY MOUTH DAILY  Dispense: 90 tablet; Refill: 3    2. Eosinophilic esophagitis  - pantoprazole (PROTONIX) 40 MG tablet [Pharmacy Med Name: PANTOPRAZOLE 40MG TABLETS]; TAKE 1 TABLET(40 MG) BY MOUTH DAILY  Dispense: 90 tablet; Refill: 3    3. Migraine without aura  - propranoloL (INDERAL LA) 60 mg 24 hr capsule [Pharmacy Med Name: PROPRANOLOL ER 60MG CAPSULES]; TAKE 1 CAPSULE(60 MG) BY MOUTH DAILY  Dispense: 90 capsule; Refill: 3    If protocol passes may refill for 12 months if within 3 months of last provider visit (or a total of 15 months).                propranoloL (INDERAL LA) 60 mg 24 hr capsule [Pharmacy Med Name: PROPRANOLOL ER 60MG CAPSULES] 90 capsule 3     Sig: TAKE 1 CAPSULE(60 MG) BY MOUTH DAILY       Beta-Blockers Refill Protocol Passed - 10/2/2020  7:17 AM        Passed - PCP or prescribing provider  visit in past 12 months or next 3 months     Last office visit with prescriber/PCP: 10/7/2019 Miles Martinez MD OR same dept: 8/20/2020 Pablo Aponte CNP OR same specialty: 8/20/2020 Pablo Aponte CNP  Last physical: Visit date not found Last MTM visit: Visit date not found   Next visit within 3 mo: Visit date not found  Next physical within 3 mo: Visit date not found  Prescriber OR PCP: Miles Martinez MD  Last diagnosis associated with med order: 1. Gastroesophageal reflux disease without esophagitis  - pantoprazole (PROTONIX) 40 MG tablet [Pharmacy Med Name: PANTOPRAZOLE 40MG TABLETS]; TAKE 1 TABLET(40 MG) BY MOUTH DAILY  Dispense: 90 tablet; Refill: 3    2. Eosinophilic esophagitis  - pantoprazole (PROTONIX) 40 MG tablet [Pharmacy Med Name: PANTOPRAZOLE 40MG TABLETS]; TAKE 1 TABLET(40 MG) BY MOUTH DAILY  Dispense: 90 tablet; Refill: 3    3. Migraine without aura  - propranoloL (INDERAL LA) 60 mg 24 hr capsule [Pharmacy Med Name: PROPRANOLOL ER 60MG CAPSULES]; TAKE 1 CAPSULE(60 MG) BY MOUTH DAILY  Dispense: 90 capsule; Refill: 3    If protocol passes may refill for 12 months if within 3 months of last provider visit (or a total of 15 months).             Passed - Blood pressure filed in past 12 months     BP Readings from Last 1 Encounters:   08/20/20 126/84

## 2021-06-16 NOTE — TELEPHONE ENCOUNTER
Telephone Encounter by Carolina Tracey at 1/7/2019  2:24 PM     Author: Carolina Tracey Service: -- Author Type: --    Filed: 1/7/2019  2:24 PM Encounter Date: 1/7/2019 Status: Signed    : Carolina Tracey approved 1/1/19-1/30/2022

## 2021-06-16 NOTE — TELEPHONE ENCOUNTER
Telephone Encounter by Carolina Tracey at 1/7/2019  2:18 PM     Author: Carolina Tracey Service: -- Author Type: --    Filed: 1/7/2019  2:20 PM Encounter Date: 1/7/2019 Status: Signed    : Carolina Tracey initiated through [employer]

## 2021-06-18 NOTE — PROGRESS NOTES
Chief Complaint   Patient presents with     Dysphagia     Pt was diagnosed previously with eosinophilic esophagitis and had a flare of this when smelling seasoning. This episode was greater than most with vomiting and nausea.         HPI: Very pleasant 32-year-old male who comes in today with complaints of an episode of vomiting and reflux that occurred last night after sniffing some spices his wife brought home from [place].  Of note, the patient has a history of eosinophilic esophagitis diagnosed via endoscopy about 10 years ago and has been on pantoprazole 40 mg daily.  He does note that he has been having some increased reflux symptoms beyond this episode.  He has had flareups of his eosinophilic esophagitis in the past but they usually resolve spontaneously and it never been this severe.  The patient was able to eat today has had no vomiting, but he does feel a little bit like there is a lump in his throat.  Symptoms continue to improve albeit slowly.  No diarrhea, constipation, hematemesis, fever, shortness of breath.    ROS:Review of Systems - History obtained from the patient  General ROS: negative  Respiratory ROS: negative  Cardiovascular ROS: negative  Gastrointestinal ROS: positive for - emesis  Neurological ROS: negative    SH: The Patient's  reports that he quit smoking about 13 years ago. He has never used smokeless tobacco. He reports that he drinks alcohol. He reports that he does not use illicit drugs.      FH: The Patient's family history includes Heart disease (age of onset: 45) in his paternal grandfather; Hypertension in his father.     Meds:    Current Outpatient Prescriptions on File Prior to Visit   Medication Sig Dispense Refill     propranolol (INDERAL LA) 60 mg 24 hr capsule Take 1 capsule (60 mg total) by mouth daily. 90 capsule 3     [DISCONTINUED] pantoprazole (PROTONIX) 40 MG tablet TAKE ONE TABLET BY MOUTH EVERY DAY 90 tablet 3     No current facility-administered medications on file  prior to visit.        O:  /80 (Patient Site: Left Arm, Patient Position: Sitting, Cuff Size: Adult Regular)  Pulse 72  Temp 98.4  F (36.9  C)  Wt (!) 237 lb 12.8 oz (107.9 kg)  SpO2 97%  BMI 28.95 kg/m2    Physical Examination:   General appearance - alert, well appearing, and in no distress  Mental status - alert, oriented to person, place, and time  Eyes - pupils equal and reactive, extraocular eye movements intact  Ears - bilateral TM's and external ear canals normal  Mouth - mucous membranes moist, pharynx normal without lesions  Neck - supple, no significant adenopathy  Lymphatics - no palpable lymphadenopathy, no hepatosplenomegaly  Chest - clear to auscultation, no wheezes, rales or rhonchi, symmetric air entry  Heart - normal rate and regular rhythm, S1 and S2 normal, no murmurs noted  Abdomen - soft, nontender, nondistended, no masses or organomegaly  Neurological - alert, oriented, normal speech, no focal findings or movement disorder noted, neck supple without rigidity, cranial nerves II through XII intact, motor and sensory grossly normal bilaterally, normal muscle tone, no tremors, strength 5/5      A/P:     Problem List Items Addressed This Visit     Eosinophilic Esophagitis    Relevant Medications    pantoprazole (PROTONIX) 20 MG tablet            1. Eosinophilic esophagitis  Given the improvement in symptoms and the story the patient present, this is most likely a flare of his eosinophilic esophagitis.  His symptoms are slowly improving, so we elected to do a watch and wait method.  The patient was informed that if he does wants a prescription for a Flovent inhaler to swallow, he can certainly call at any time and I will be happy to send that into the pharmacy.  Review of up-to-date notes no specific dosing recommendations for proton pump inhibitors, but it did recommend twice a day dosing if feasible with the patient schedule.  New prescription sent in for his pantoprazole to the  pharmacy for twice daily dosing with directions that if his symptoms worsen to go back onto the once a day dosing.  If he continues to have reflux issues despite PPI therapy, he was also informed that he could go back to gastroenterology at any time for repeat scope.    - pantoprazole (PROTONIX) 20 MG tablet; Take 1 tablet (20 mg total) by mouth 2 (two) times a day.  Dispense: 180 tablet; Refill: 1        Pablo Aponte, CNP

## 2021-06-20 NOTE — PROGRESS NOTES
Assessment:  1.  Esophageal reflux, controlled.  2.  Eosinophilic esophagitis, under control.  3.  Common migraines, controlled with current prophylactic regimen.  4.  Screening for lipids.  5.  Overweight, but BMI probably does not accurately assess his lean body mass given his bone size and muscular nature etc.    Plan: Check fasting lipids, basic profile and hemoglobin.  Renewed prescription for both pantoprazole and propranolol for the next year.  Follow-up at minimum yearly.  Earlier as needed.  Continue efforts at some weight reduction.    Subjective: 32-year-old male presented for follow-up on the above.  He notes that taking the pantoprazole 20 mg twice a day does work better for him than the 40 mg once a day.  He has not had any heartburn on the current regimen.  See the note in May regarding the eosinophilic esophagitis and his past history and past scopes etc.  He notes that that he remains on the propranolol for the common migraines in his lung he is he is on that he is well controlled.  But in the past when he tried to go off of that he did have definite flare of his migraine headaches.  He is fasting this morning.  He notes that he has about 1200 celine per day of intake and he notes his wife is a nutritionist and he has a hard boiled egg for breakfast, no lunch currently, and has about a half a cup or so of green vegetables at supper along with half a chicken breast or so.  And with that he has not been able lose further weight.  But is otherwise feeling fine.  .pmh  Allergies   Allergen Reactions     Codeine      Sertraline Rash     Current Outpatient Prescriptions   Medication Sig Dispense Refill     pantoprazole (PROTONIX) 20 MG tablet Take 1 tablet (20 mg total) by mouth 2 (two) times a day. 180 tablet 3     propranolol (INDERAL LA) 60 mg 24 hr capsule TAKE 1 CAPSULE(60 MG) BY MOUTH DAILY 90 capsule 3     No current facility-administered medications for this visit.      All other review of systems  are negative.    Objective:/90  Pulse (!) 58  Resp 16  Wt (!) 236 lb (107 kg)  SpO2 99%  BMI 28.73 kg/m2  HEENT examination is negative.  Neck supple without adenopathy or thyromegaly.  Lungs clear.  Heart regular rate and rhythm without murmur.  Abdomen shows no masses tenderness or hepatosplenomegaly.  No pedal edema.  Is alert with clear speech.

## 2021-06-22 NOTE — TELEPHONE ENCOUNTER
Prior Authorization Request  Who s requesting:  Pharmacy  Pharmacy Name and Location: [facility]   Medication Name: pantoprazole (PROTONIX) 20 MG tablet  Insurance Plan: [employer]  Insurance Member ID Number:  [id number]  Informed patient that prior authorizations can take up to 10 business days for response:   No  Okay to leave a detailed message: Yes

## 2021-07-05 ENCOUNTER — COMMUNICATION - HEALTHEAST (OUTPATIENT)
Dept: FAMILY MEDICINE | Facility: CLINIC | Age: 36
End: 2021-07-05

## 2021-07-05 DIAGNOSIS — G43.009 MIGRAINE WITHOUT AURA: ICD-10-CM

## 2021-07-05 DIAGNOSIS — K21.9 GASTROESOPHAGEAL REFLUX DISEASE WITHOUT ESOPHAGITIS: ICD-10-CM

## 2021-07-05 DIAGNOSIS — K20.0 EOSINOPHILIC ESOPHAGITIS: ICD-10-CM

## 2021-07-06 NOTE — TELEPHONE ENCOUNTER
Telephone Encounter by Ann Marie Jeffrey RN at 7/5/2021 10:48 PM     Author: Ann Marie Jeffrey RN Service: -- Author Type: Registered Nurse    Filed: 7/5/2021 10:49 PM Encounter Date: 7/5/2021 Status: Signed    : Ann Marie Jeffrey RN (Registered Nurse)       Refill Approved x 2    Rx renewed per Medication Renewal Policy. Medication was last renewed on 10/03/2020.  Last office visit was 08/20/2020 with PCP.    Ann Marie Jeffrey, [facility] Triage/Med Refill 7/5/2021     Requested Prescriptions   Pending Prescriptions Disp Refills   ? pantoprazole (PROTONIX) 40 MG tablet [Pharmacy Med Name: PANTOPRAZOLE 40MG TABLETS] 90 tablet 2     Sig: TAKE ONE TABLET BY MOUTH EVERY DAY       GI Medications Refill Protocol Passed - 7/5/2021  8:50 PM        Passed - PCP or prescribing provider visit in last 12 or next 3 months.     Last office visit with prescriber/PCP: 8/20/2020 Pablo Aponte CNP OR same dept: 8/20/2020 Pablo Aponte CNP OR same specialty: 8/20/2020 Pablo Aponte CNP  Last physical: Visit date not found Last MTM visit: Visit date not found   Next visit within 3 mo: Visit date not found  Next physical within 3 mo: Visit date not found  Prescriber OR PCP: Pablo Aponte CNP  Last diagnosis associated with med order: 1. Gastroesophageal reflux disease without esophagitis  - pantoprazole (PROTONIX) 40 MG tablet [Pharmacy Med Name: PANTOPRAZOLE 40MG TABLETS]; TAKE ONE TABLET BY MOUTH EVERY DAY  Dispense: 90 tablet; Refill: 2    2. Eosinophilic esophagitis  - pantoprazole (PROTONIX) 40 MG tablet [Pharmacy Med Name: PANTOPRAZOLE 40MG TABLETS]; TAKE ONE TABLET BY MOUTH EVERY DAY  Dispense: 90 tablet; Refill: 2    3. Migraine without aura  - propranoloL (INDERAL LA) 60 mg 24 hr capsule [Pharmacy Med Name: PROPRANOLOL ER 60MG CAPSULES]; TAKE ONE CAPSULE BY MOUTH EVERY DAY  Dispense: 90 capsule; Refill: 2    If protocol passes may refill for 12 months if within 3 months of last provider visit (or a total of 15  months).              ? propranoloL (INDERAL LA) 60 mg 24 hr capsule [Pharmacy Med Name: PROPRANOLOL ER 60MG CAPSULES] 90 capsule 2     Sig: TAKE ONE CAPSULE BY MOUTH EVERY DAY       Beta-Blockers Refill Protocol Passed - 7/5/2021  8:50 PM        Passed - PCP or prescribing provider visit in past 12 months or next 3 months     Last office visit with prescriber/PCP: 8/20/2020 Pablo Aponte CNP OR same dept: 8/20/2020 Pablo Aponte CNP OR same specialty: 8/20/2020 Pablo Aponte CNP  Last physical: Visit date not found Last MTM visit: Visit date not found   Next visit within 3 mo: Visit date not found  Next physical within 3 mo: Visit date not found  Prescriber OR PCP: Pablo Aponte CNP  Last diagnosis associated with med order: 1. Gastroesophageal reflux disease without esophagitis  - pantoprazole (PROTONIX) 40 MG tablet [Pharmacy Med Name: PANTOPRAZOLE 40MG TABLETS]; TAKE ONE TABLET BY MOUTH EVERY DAY  Dispense: 90 tablet; Refill: 2    2. Eosinophilic esophagitis  - pantoprazole (PROTONIX) 40 MG tablet [Pharmacy Med Name: PANTOPRAZOLE 40MG TABLETS]; TAKE ONE TABLET BY MOUTH EVERY DAY  Dispense: 90 tablet; Refill: 2    3. Migraine without aura  - propranoloL (INDERAL LA) 60 mg 24 hr capsule [Pharmacy Med Name: PROPRANOLOL ER 60MG CAPSULES]; TAKE ONE CAPSULE BY MOUTH EVERY DAY  Dispense: 90 capsule; Refill: 2    If protocol passes may refill for 12 months if within 3 months of last provider visit (or a total of 15 months).             Passed - Blood pressure filed in past 12 months     BP Readings from Last 1 Encounters:   08/20/20 126/84

## 2021-10-11 ENCOUNTER — HEALTH MAINTENANCE LETTER (OUTPATIENT)
Age: 36
End: 2021-10-11

## 2022-01-30 ENCOUNTER — HEALTH MAINTENANCE LETTER (OUTPATIENT)
Age: 37
End: 2022-01-30

## 2022-09-25 ENCOUNTER — HEALTH MAINTENANCE LETTER (OUTPATIENT)
Age: 37
End: 2022-09-25

## 2023-05-08 ENCOUNTER — HEALTH MAINTENANCE LETTER (OUTPATIENT)
Age: 38
End: 2023-05-08

## 2024-05-11 ENCOUNTER — HEALTH MAINTENANCE LETTER (OUTPATIENT)
Age: 39
End: 2024-05-11